# Patient Record
Sex: FEMALE | Race: WHITE
[De-identification: names, ages, dates, MRNs, and addresses within clinical notes are randomized per-mention and may not be internally consistent; named-entity substitution may affect disease eponyms.]

---

## 2017-03-10 ENCOUNTER — HOSPITAL ENCOUNTER (OUTPATIENT)
Dept: HOSPITAL 17 - CPRE | Age: 82
End: 2017-03-10
Attending: ORTHOPAEDIC SURGERY
Payer: MEDICARE

## 2017-03-10 DIAGNOSIS — Z01.812: ICD-10-CM

## 2017-03-10 DIAGNOSIS — Z79.01: ICD-10-CM

## 2017-03-10 DIAGNOSIS — M17.11: ICD-10-CM

## 2017-03-10 DIAGNOSIS — Z01.810: Primary | ICD-10-CM

## 2017-03-10 LAB
ANION GAP SERPL CALC-SCNC: 9 MEQ/L (ref 5–15)
APTT BLD: 27.4 SEC (ref 24.3–30.1)
BASOPHILS # BLD AUTO: 0 TH/MM3 (ref 0–0.2)
BASOPHILS NFR BLD: 0.3 % (ref 0–2)
BUN SERPL-MCNC: 13 MG/DL (ref 7–18)
CHLORIDE SERPL-SCNC: 100 MEQ/L (ref 98–107)
COLOR UR: (no result)
COMMENT (UR): (no result)
CULTURE IF INDICATED: (no result)
EOSINOPHIL # BLD: 0 TH/MM3 (ref 0–0.4)
EOSINOPHIL NFR BLD: 0.7 % (ref 0–4)
ERYTHROCYTE [DISTWIDTH] IN BLOOD BY AUTOMATED COUNT: 12.8 % (ref 11.6–17.2)
ERYTHROCYTE [SEDIMENTATION RATE] IN BLOOD BY WESTERGREN METHOD: 10 MM/HR (ref 0–30)
GFR SERPLBLD BASED ON 1.73 SQ M-ARVRAT: 75 ML/MIN (ref 89–?)
GLUCOSE UR STRIP-MCNC: (no result) MG/DL
HCO3 BLD-SCNC: 28.2 MEQ/L (ref 21–32)
HCT VFR BLD CALC: 42 % (ref 35–46)
HEMO FLAGS: (no result)
HGB UR QL STRIP: (no result)
INR PPP: 0.9 RATIO
KETONES UR STRIP-MCNC: (no result) MG/DL
LYMPHOCYTES # BLD AUTO: 0.9 TH/MM3 (ref 1–4.8)
LYMPHOCYTES NFR BLD AUTO: 16.5 % (ref 9–44)
MCH RBC QN AUTO: 32.5 PG (ref 27–34)
MCHC RBC AUTO-ENTMCNC: 33.9 % (ref 32–36)
MCV RBC AUTO: 96 FL (ref 80–100)
MONOCYTES NFR BLD: 8.6 % (ref 0–8)
NEUTROPHILS # BLD AUTO: 4.1 TH/MM3 (ref 1.8–7.7)
NEUTROPHILS NFR BLD AUTO: 73.9 % (ref 16–70)
NITRITE UR QL STRIP: (no result)
PLATELET # BLD: 238 TH/MM3 (ref 150–450)
POTASSIUM SERPL-SCNC: 4.3 MEQ/L (ref 3.5–5.1)
PROTHROMBIN TIME: 10.4 SEC (ref 9.8–11.6)
RBC # BLD AUTO: 4.38 MIL/MM3 (ref 4–5.3)
SODIUM SERPL-SCNC: 137 MEQ/L (ref 136–145)
SP GR UR STRIP: 1.01 (ref 1–1.03)
WBC # BLD AUTO: 5.6 TH/MM3 (ref 4–11)

## 2017-03-10 PROCEDURE — 80048 BASIC METABOLIC PNL TOTAL CA: CPT

## 2017-03-10 PROCEDURE — 36415 COLL VENOUS BLD VENIPUNCTURE: CPT

## 2017-03-10 PROCEDURE — 85652 RBC SED RATE AUTOMATED: CPT

## 2017-03-10 PROCEDURE — 85025 COMPLETE CBC W/AUTO DIFF WBC: CPT

## 2017-03-10 PROCEDURE — 81001 URINALYSIS AUTO W/SCOPE: CPT

## 2017-03-10 PROCEDURE — 85610 PROTHROMBIN TIME: CPT

## 2017-03-10 PROCEDURE — 85730 THROMBOPLASTIN TIME PARTIAL: CPT

## 2017-03-10 PROCEDURE — 93005 ELECTROCARDIOGRAM TRACING: CPT

## 2017-03-10 NOTE — EKG
Date Performed: 03/10/2017       Time Performed: 08:27:05

 

PTAGE:      84 years

 

EKG:      Sinus rhythm 

 

 Nonspecific ST and T wave abnormalities 

 

NO PREVIOUS TRACING            

 

DOCTOR:   Marquez Pope  Interpretating Date/Time  03/10/2017 18:53:08

## 2017-03-23 ENCOUNTER — HOSPITAL ENCOUNTER (INPATIENT)
Dept: HOSPITAL 17 - HSDC | Age: 82
LOS: 3 days | Discharge: NURSING, REHAB OR LONG-TERM CARE | DRG: 470 | End: 2017-03-26
Attending: ORTHOPAEDIC SURGERY | Admitting: ORTHOPAEDIC SURGERY
Payer: MEDICARE

## 2017-03-23 VITALS
RESPIRATION RATE: 18 BRPM | OXYGEN SATURATION: 98 % | HEART RATE: 80 BPM | SYSTOLIC BLOOD PRESSURE: 125 MMHG | DIASTOLIC BLOOD PRESSURE: 62 MMHG | TEMPERATURE: 95.8 F

## 2017-03-23 VITALS
HEART RATE: 85 BPM | DIASTOLIC BLOOD PRESSURE: 59 MMHG | RESPIRATION RATE: 16 BRPM | OXYGEN SATURATION: 100 % | TEMPERATURE: 96.7 F | SYSTOLIC BLOOD PRESSURE: 103 MMHG

## 2017-03-23 VITALS
OXYGEN SATURATION: 100 % | RESPIRATION RATE: 16 BRPM | SYSTOLIC BLOOD PRESSURE: 132 MMHG | HEART RATE: 75 BPM | TEMPERATURE: 98.4 F | DIASTOLIC BLOOD PRESSURE: 65 MMHG

## 2017-03-23 VITALS — HEIGHT: 64 IN | WEIGHT: 134.92 LBS | BODY MASS INDEX: 23.03 KG/M2

## 2017-03-23 VITALS
HEART RATE: 69 BPM | RESPIRATION RATE: 16 BRPM | DIASTOLIC BLOOD PRESSURE: 75 MMHG | OXYGEN SATURATION: 99 % | SYSTOLIC BLOOD PRESSURE: 134 MMHG | TEMPERATURE: 98 F

## 2017-03-23 DIAGNOSIS — M17.11: Primary | ICD-10-CM

## 2017-03-23 PROCEDURE — 86901 BLOOD TYPING SEROLOGIC RH(D): CPT

## 2017-03-23 PROCEDURE — L1830 KO IMMOB CANVAS LONG PRE OTS: HCPCS

## 2017-03-23 PROCEDURE — C1776 JOINT DEVICE (IMPLANTABLE): HCPCS

## 2017-03-23 PROCEDURE — 73560 X-RAY EXAM OF KNEE 1 OR 2: CPT

## 2017-03-23 PROCEDURE — C9290 INJ, BUPIVACAINE LIPOSOME: HCPCS

## 2017-03-23 PROCEDURE — 86920 COMPATIBILITY TEST SPIN: CPT

## 2017-03-23 PROCEDURE — 0SRC0J9 REPLACEMENT OF RIGHT KNEE JOINT WITH SYNTHETIC SUBSTITUTE, CEMENTED, OPEN APPROACH: ICD-10-PCS | Performed by: ORTHOPAEDIC SURGERY

## 2017-03-23 PROCEDURE — 85014 HEMATOCRIT: CPT

## 2017-03-23 PROCEDURE — 86850 RBC ANTIBODY SCREEN: CPT

## 2017-03-23 PROCEDURE — 85018 HEMOGLOBIN: CPT

## 2017-03-23 PROCEDURE — 86900 BLOOD TYPING SEROLOGIC ABO: CPT

## 2017-03-23 RX ADMIN — MAGNESIUM HYDROXIDE SCH ML: 400 SUSPENSION ORAL at 20:41

## 2017-03-23 RX ADMIN — POVIDONE-IODINE SCH APPLIC: 7.5 SOLUTION TOPICAL at 07:30

## 2017-03-23 RX ADMIN — PRAVASTATIN SODIUM SCH MG: 40 TABLET ORAL at 20:40

## 2017-03-23 RX ADMIN — SODIUM CHLORIDE, PRESERVATIVE FREE SCH ML: 5 INJECTION INTRAVENOUS at 20:41

## 2017-03-23 RX ADMIN — SENNOSIDES SCH MG: 8.6 TABLET, FILM COATED ORAL at 20:41

## 2017-03-23 RX ADMIN — OXYTOCIN SCH MLS/HR: 10 INJECTION, SOLUTION INTRAMUSCULAR; INTRAVENOUS at 23:58

## 2017-03-23 RX ADMIN — OXYTOCIN SCH MLS/HR: 10 INJECTION, SOLUTION INTRAMUSCULAR; INTRAVENOUS at 12:12

## 2017-03-23 NOTE — HHI.DS
Discharge Summary


Admission Date


Mar 23, 2017 at 10:20


Discharge Date:  Mar 26, 2017


Admitting Diagnosis


see below


Diagnosis:  


(1) Right knee pain


Diagnosis:  Principal





(2) Osteoarthritis of right knee


Diagnosis:  Principal





Procedures


Right total knee arthroplasty


Brief History


This is a 85 year old female patient with a history of right knee pain.  When 

her function and independence began to decline she sought out medical 

evaluation.  Imaging studies were performed showing moderate to severe right 

knee arthritis. Conservative measures were pursued for a period of time but 

because of her age, surgical treatment was quickly recommended.  She elected to 

move forward with right total knee arthroplasty.


Hospital Course


Surgical treatment was performed on the day of admission without complication.  

She recovered well in PACU and was transferred to the orthopaedic floor.  Pain 

was controlled with IV and oral medications.  DVT prophylaxis was initiated pod#

1.  She was compliant with physical therapy and all precautions.  After 3 days 

she was found to be stable and discharged to skilled nursing with instruction 

to continue therapy, continue her anticoagulant for 15 days and to pursue a  

high fiber diet for 3-5 days postop.


Pt Condition on Discharge:  Stable


Discharge Disposition:  Discharge to SNF


Discharge Instructions


Diet Instructions:  As Tolerated, No Restrictions, High Fiber Diet


Activities You Can Perform:  Weight Bearing as Deven


Activities to Avoid:  Strenuous Activity


Additional Activity Instruc.:  


TKA protocol


New Medications:  


3-in-1 Bedside Toilet (3-in-1 Bedside Toilet) 1 Mis Mis


1 EA .ROUTE AS DIRECTED #1 EA


CPM-Continuous Passive Motion Machine (CPM-Continuous Passive Motion Machine) 1 

Ea Device


1 EA .ROUTE AS DIRECTED #1 Ref 0 EA


Walker with Front Wheels (Walker with Front Wheels) 1 Mis Mis


1 EA .ROUTE AS DIRECTED #1 Ref 0 EA


Oxycodone-Acetaminophen (Oxycodone-Acetaminophen) 5-325 mg Tab


1 TAB PO Q4H PRN PAIN LESS THAN 5 ON SCALE #50 TAB


Rivaroxaban (Xarelto) 10 Mg Tab


10 MG PO Q24H Prevent Blood Clot #15 TAB


 


Continued Medications:  


Ascorbic Acid (Vitamin C) 500 Mg Cap


500 MG PO DAILY Nutritional Supplement Ref 0 CAP


Biotin (Biotin) 5 Mg Tab


5 MG PO DAILY #1 BOTTLE


Calcium Carbonate-Cholecalciferol (Calcium 600 + D) 600-200 Mg-Unit Tab


2 TAB PO EVERY OTHER DAY TAB


Calcium Carbonate-Vitamin D (Calcium 600+D) 600-400 Mg-Unit Tab


1 TAB PO DAILY TAB


Krill Oil (Krill Oil 500 mg) 1 Cap Cap


1 CAP PO DAILY


Simvastatin (Simvastatin) 20 Mg Tab


20 MG PO HS Cholesterol Management #30 Ref 0 TAB











Thao Sandoval Mar 23, 2017 15:05

## 2017-03-23 NOTE — PD.OP
cc:   Arron Oconnell MD


__________________________________________________





Operative Report


Date of Surgery:  Mar 23, 2017


Preoperative Diagnosis:  


Osteoarthritis right knee, severe


Postoperative Diagnosis:  


Same


Procedure:


Right total knee replacement arthroplasty


Anesthesia:


Gen. with regional


Surgeon:


Arron Oconnell


Assistant(s):


TAYLOR Sigala


Operation and Findings:


EBL: 50 cc





INDICATION: This patient presents with long-standing arthritis of the knee.  

Attachment record documents conservative measures.  The patient now presents 

for surgical treatment.





NOTE: Dasha Sigala PA-C was present for the entire surgical procedure as my 

first assistant.  In my medical opinion her skill and care was necessary for 

proper management of this patient.





TOURNIQUET TIME: 42 minutes





COMPANY: [employer]


   FEMUR: Size 3, right, cruciate retaining


   TIBIA: Size 3, fixed bearing


   PATELLA: 35 mm


   POLYETHYLENE INSERT: 11 mm





PROCEDURE: This patient was brought the operating room and anesthetized in the 

supine position.  The patient was positioned supine on the table.  The 

tourniquet was placed about the thigh, and the leg was scrubbed with alcohol 

followed by Hibiclens followed by ChloraPrep and draped sterilely.  A timeout 

was done, and antibiotics were given.  After exsanguination the tourniquet was 

inflated to 250 mmHg.  An anterior incision was made and a median parapatellar 

arthrotomy was performed.  The patella was released laterally and subluxed 

allowing freehand cut of the patella which was then sized.  A metal cap was 

placed over the exposed patellar surface for protection.





A  hole was placed in the distal femur allowing a 5 valgus cut removing 

10 mm from the distal femur.  Anterior posterior and chamfer cuts were made.  

The posterior stabilize osteotomy was made.





The attention was directed to the tibia.  Retractors were positioned.  The 

external alignment guide was used allowing the lateral tibia to be used as 

referencing guide and cut utilizing an oscillating saw taking care to avoid any 

injury to the surrounding soft tissues.  This was sized properly.  Trial 

reduction showed that the insert fit nicely.  The patient had range of motion 

extension 0 flexion 125.  A medial release was not necessary.





The bony surfaces prepared.  On the back table 2 packets of methylmethacrylate 

were mixed.  The components were cemented.  Excess cement was removed.  The 

tourniquet let down and hemostasis was controlled.  The final plastic insert 

was inserted.  Range of motion was the same as previously  noted.





A drain was brought through a separate stab incision.  The arthrotomy was 

repaired with interrupted #1 Vicryl suture, subcutaneous tissue 2-0 Vicryl 

suture and skin with metallic staples





A sterile dressing was applied.  Sponge counts, needle counts and instrument 

counts were all correct.  The patient tolerated procedure well and was taken to 

recovery in satisfactory condition.





FINDINGS: There was evidence of severe erosion of articular cartilage and bone 

stock in the medial compartment.  There was also approximately 40-50% loss of 

articular cartilage especially on the medial aspect of the lateral tibia 

plateau extending into the intercondylar notch showing evidence of significant 

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








Arron Oconnell MD Mar 23, 2017 10:23

## 2017-03-23 NOTE — RADRPT
EXAM DATE/TIME:  03/23/2017 10:36 

 

[facility] COMPARISON:     

No previous studies available for comparison.

 

                     

INDICATIONS :     

Post right knee arthroplasty

                     

 

MEDICAL HISTORY :     

Arthritis.          

 

SURGICAL HISTORY :     

None.   

 

ENCOUNTER:     

Initial                                        

 

ACUITY:     

1 day      

 

PAIN SCORE:     

Non-responsive.

 

LOCATION:     

Right  Knee

 

FINDINGS:     

The patient is status post right total knee replacement with prosthesis in good position.  There is n
o fracture or dislocation. 

 

CONCLUSION:     

 

1. Status pot right total knee replacement with prosthesis in good position. 

2.  No acute fracture or dislocation. 

 

 Tushar Sandoval MD on March 23, 2017 at 11:06                

Board Certified Radiologist.

 This report was verified electronically.

## 2017-03-23 NOTE — HHI.DCPOC
Discharge Care Plan


Diagnosis:  


(1) Right knee pain


(2) Osteoarthritis of right knee


Your Health Problems Are:     Incision/Drains


Goals to Promote Your Health


* To prevent worsening of your condition and complications


* To maintain your health at the optimal level


Directions to Meet Your Goals


*** Take your medications as prescribed


*** Follow your dietary instruction


*** Follow activity as directed








*** Keep your appointments as scheduled


*** Take your immunizations and boosters as scheduled


*** If your symptoms worsen call your PCP, if no PCP go to Urgent Care Center 

or Emergency Room***


*** Smoking is Dangerous to Your Health. Avoid second hand smoke***


***Call the 24-hour hour crisis hotline for domestic abuse at [phone]***








Thao Sandoval Mar 23, 2017 15:01

## 2017-03-24 VITALS
HEART RATE: 86 BPM | SYSTOLIC BLOOD PRESSURE: 100 MMHG | DIASTOLIC BLOOD PRESSURE: 56 MMHG | OXYGEN SATURATION: 97 % | RESPIRATION RATE: 17 BRPM | TEMPERATURE: 96.9 F

## 2017-03-24 VITALS
RESPIRATION RATE: 16 BRPM | TEMPERATURE: 96.5 F | DIASTOLIC BLOOD PRESSURE: 57 MMHG | OXYGEN SATURATION: 97 % | HEART RATE: 79 BPM | SYSTOLIC BLOOD PRESSURE: 100 MMHG

## 2017-03-24 VITALS
HEART RATE: 68 BPM | OXYGEN SATURATION: 100 % | TEMPERATURE: 95.5 F | SYSTOLIC BLOOD PRESSURE: 115 MMHG | RESPIRATION RATE: 16 BRPM | DIASTOLIC BLOOD PRESSURE: 61 MMHG

## 2017-03-24 VITALS
DIASTOLIC BLOOD PRESSURE: 49 MMHG | OXYGEN SATURATION: 99 % | HEART RATE: 75 BPM | RESPIRATION RATE: 17 BRPM | TEMPERATURE: 98 F | SYSTOLIC BLOOD PRESSURE: 102 MMHG

## 2017-03-24 VITALS
OXYGEN SATURATION: 94 % | TEMPERATURE: 96.8 F | HEART RATE: 90 BPM | DIASTOLIC BLOOD PRESSURE: 54 MMHG | RESPIRATION RATE: 17 BRPM | SYSTOLIC BLOOD PRESSURE: 109 MMHG

## 2017-03-24 VITALS
TEMPERATURE: 96.1 F | HEART RATE: 77 BPM | SYSTOLIC BLOOD PRESSURE: 121 MMHG | OXYGEN SATURATION: 97 % | DIASTOLIC BLOOD PRESSURE: 63 MMHG | RESPIRATION RATE: 16 BRPM

## 2017-03-24 LAB
HCT VFR BLD CALC: 28.4 % (ref 35–46)
REVIEW FLAG: (no result)

## 2017-03-24 RX ADMIN — MAGNESIUM HYDROXIDE SCH ML: 400 SUSPENSION ORAL at 21:00

## 2017-03-24 RX ADMIN — SENNOSIDES SCH MG: 8.6 TABLET, FILM COATED ORAL at 21:01

## 2017-03-24 RX ADMIN — PRAVASTATIN SODIUM SCH MG: 40 TABLET ORAL at 21:01

## 2017-03-24 RX ADMIN — OXYCODONE HYDROCHLORIDE AND ACETAMINOPHEN PRN TAB: 5; 325 TABLET ORAL at 13:11

## 2017-03-24 RX ADMIN — SODIUM CHLORIDE, PRESERVATIVE FREE SCH ML: 5 INJECTION INTRAVENOUS at 21:00

## 2017-03-24 RX ADMIN — OXYTOCIN SCH MLS/HR: 10 INJECTION, SOLUTION INTRAMUSCULAR; INTRAVENOUS at 20:56

## 2017-03-24 RX ADMIN — OXYCODONE HYDROCHLORIDE AND ACETAMINOPHEN PRN TAB: 5; 325 TABLET ORAL at 21:01

## 2017-03-24 RX ADMIN — OXYTOCIN SCH MLS/HR: 10 INJECTION, SOLUTION INTRAMUSCULAR; INTRAVENOUS at 11:40

## 2017-03-24 RX ADMIN — MAGNESIUM HYDROXIDE SCH ML: 400 SUSPENSION ORAL at 10:42

## 2017-03-24 RX ADMIN — RIVAROXABAN SCH MG: 10 TABLET, FILM COATED ORAL at 10:42

## 2017-03-24 RX ADMIN — POVIDONE-IODINE SCH APPLIC: 7.5 SOLUTION TOPICAL at 05:50

## 2017-03-24 RX ADMIN — OXYCODONE HYDROCHLORIDE AND ACETAMINOPHEN PRN TAB: 5; 325 TABLET ORAL at 08:45

## 2017-03-24 RX ADMIN — SODIUM CHLORIDE, PRESERVATIVE FREE SCH ML: 5 INJECTION INTRAVENOUS at 09:00

## 2017-03-24 NOTE — PD.ORT.PN
Subjective


Subjective Remarks


Mild to moderate right knee pain.  Well controlled at this point.  No new 

radiating leg pain.  No CP or SOB.  No other concerns.  Planning on d/c to snf.

  Children in town to help.





Objective


Vitals





 Vital Signs








  Date Time  Temp Pulse Resp B/P Pulse Ox O2 Delivery O2 Flow Rate FiO2


 


3/24/17 05:47   17     


 


3/24/17 04:35 96.9 86 17 100/56 97   


 


3/24/17 00:40 98.0 75 17 102/49 99   


 


3/23/17 22:39   18     


 


3/23/17 20:00 98.4 75 16 132/65 100   


 


3/23/17 16:00 96.7 85 16 103/59 100   


 


3/23/17 13:47   17     


 


3/23/17 13:35     98 Nasal Cannula 1.00 


 


3/23/17 13:30 95.8 80 18 125/62 98   


 


3/23/17 13:15  74 12 126/67 99 Nasal Cannula 2 


 


3/23/17 13:00 97.4 75 14 127/71 100 Nasal Cannula 2 


 


3/23/17 12:53 97.4       


 


3/23/17 12:30  74 12 130/72 99 Nasal Cannula 2 


 


3/23/17 12:15  73 13 128/72 99 Nasal Cannula 2 


 


3/23/17 12:00  76 14 133/74 99 Nasal Cannula 2 


 


3/23/17 11:45  71 15 136/76 99 Nasal Cannula 2 


 


3/23/17 11:35 96.2       


 


3/23/17 11:30  81 14 132/80 98 Nasal Cannula 2 


 


3/23/17 11:15  78 15 118/84 98 Nasal Cannula 2 


 


3/23/17 11:10   15     


 


3/23/17 11:00  79 14 147/77 99 Nasal Cannula 2 


 


3/23/17 10:45  89 15 143/86 100 Nasal Cannula 2 


 


3/23/17 10:40 96.2       


 


3/23/17 10:35 96.2 93 15 144/85 100 Nasal Cannula 2 








 I/O








 3/23/17 3/23/17 3/23/17 3/24/17 3/24/17 3/24/17





 07:00 15:00 23:00 07:00 15:00 23:00


 


Intake Total  1691 ml 823 ml 712 ml  


 


Output Total  545 ml 2235 ml 955 ml  


 


Balance  1146 ml -1412 ml -243 ml  


 


      


 


Intake Oral   120 ml 120 ml  


 


IV Total  191 ml 703 ml 592 ml  


 


Other  1500 ml    


 


Output Urine Total  100 ml 1975 ml 775 ml  


 


Drainage Total  70 ml 260 ml 180 ml  


 


Estimated Blood Loss  25 ml    


 


Other  350 ml    








Result Diagram:  


3/24/17 0505





Imaging





Last 24 hours Impressions








Knee X-Ray 3/23/17 1015 Signed





Impressions: 





 Service Date/Time:  Thursday, March 23, 2017 10:36 - CONCLUSION:   1. Status 

pot 





 right total knee replacement with prosthesis in good position.  2.  No acute 





 fracture or dislocation.    Tushar Sandoval MD 








Procedures


Right total knee arthroplasty


Objective Remarks


Laying in bed


NAD


VSS





RLE   Dressing c/d/i, drain in place, mild swelling, no erythema


      thigh and calf both supple, neg homans


      +motor at,  +sens,  +nvi





Assessment & Plan


Ortho Post Op Day #:  1


Problem List:  


(1) Right knee pain


(2) Osteoarthritis of right knee


Assessment and Plan


pod#1 s/p R TKA





D/C PCA - change to po pain meds. 


Xarelto 10mg qd. 


Hold dressing changes unless saturated.  D/C knee drain today.


PT - WBAT RLE.  TKA protocol. CKS in bed. 


D/C planning, SNF sunday. 


F/U in 2 weeks as scheduled. 


[id number] signed.








Thao Sandoval Mar 24, 2017 07:53

## 2017-03-25 VITALS
RESPIRATION RATE: 16 BRPM | HEART RATE: 81 BPM | SYSTOLIC BLOOD PRESSURE: 101 MMHG | TEMPERATURE: 98.5 F | DIASTOLIC BLOOD PRESSURE: 52 MMHG | OXYGEN SATURATION: 94 %

## 2017-03-25 VITALS
OXYGEN SATURATION: 97 % | RESPIRATION RATE: 18 BRPM | SYSTOLIC BLOOD PRESSURE: 109 MMHG | TEMPERATURE: 96.8 F | HEART RATE: 100 BPM | DIASTOLIC BLOOD PRESSURE: 63 MMHG

## 2017-03-25 VITALS
OXYGEN SATURATION: 96 % | DIASTOLIC BLOOD PRESSURE: 69 MMHG | TEMPERATURE: 99.8 F | HEART RATE: 88 BPM | SYSTOLIC BLOOD PRESSURE: 115 MMHG | RESPIRATION RATE: 18 BRPM

## 2017-03-25 VITALS
OXYGEN SATURATION: 94 % | TEMPERATURE: 100.1 F | HEART RATE: 88 BPM | SYSTOLIC BLOOD PRESSURE: 116 MMHG | DIASTOLIC BLOOD PRESSURE: 59 MMHG | RESPIRATION RATE: 18 BRPM

## 2017-03-25 VITALS
OXYGEN SATURATION: 95 % | DIASTOLIC BLOOD PRESSURE: 54 MMHG | HEART RATE: 85 BPM | RESPIRATION RATE: 18 BRPM | SYSTOLIC BLOOD PRESSURE: 101 MMHG | TEMPERATURE: 99.4 F

## 2017-03-25 RX ADMIN — OXYCODONE HYDROCHLORIDE AND ACETAMINOPHEN PRN TAB: 5; 325 TABLET ORAL at 08:21

## 2017-03-25 RX ADMIN — RIVAROXABAN SCH MG: 10 TABLET, FILM COATED ORAL at 08:20

## 2017-03-25 RX ADMIN — PRAVASTATIN SODIUM SCH MG: 40 TABLET ORAL at 22:44

## 2017-03-25 RX ADMIN — OXYTOCIN SCH MLS/HR: 10 INJECTION, SOLUTION INTRAMUSCULAR; INTRAVENOUS at 12:15

## 2017-03-25 RX ADMIN — SODIUM CHLORIDE, PRESERVATIVE FREE SCH ML: 5 INJECTION INTRAVENOUS at 22:45

## 2017-03-25 RX ADMIN — MAGNESIUM HYDROXIDE SCH ML: 400 SUSPENSION ORAL at 21:00

## 2017-03-25 RX ADMIN — SENNOSIDES SCH MG: 8.6 TABLET, FILM COATED ORAL at 21:00

## 2017-03-25 RX ADMIN — MAGNESIUM HYDROXIDE SCH ML: 400 SUSPENSION ORAL at 08:20

## 2017-03-25 RX ADMIN — OXYCODONE HYDROCHLORIDE AND ACETAMINOPHEN PRN TAB: 5; 325 TABLET ORAL at 18:16

## 2017-03-25 RX ADMIN — SODIUM CHLORIDE, PRESERVATIVE FREE SCH ML: 5 INJECTION INTRAVENOUS at 08:20

## 2017-03-25 RX ADMIN — POVIDONE-IODINE SCH APPLIC: 7.5 SOLUTION TOPICAL at 04:26

## 2017-03-25 NOTE — PD.ORT.PN
Subjective


Subjective Remarks


POD 2 s/p right TKA


doing well. pain controlled. walked with walker yesterday.





Objective


Vitals





 Vital Signs








  Date Time  Temp Pulse Resp B/P Pulse Ox O2 Delivery O2 Flow Rate FiO2


 


3/25/17 00:25 98.5 81 16 101/52 94   


 


3/24/17 20:30 96.8 90 17 109/54 94   


 


3/24/17 16:00 96.1 77 16 121/63 97   


 


3/24/17 12:35 95.5 68 16 115/61 100   


 


3/24/17 07:35 96.5 79 16 100/57 97   








 I/O








 3/24/17 3/24/17 3/24/17 3/25/17 3/25/17 3/25/17





 07:00 15:00 23:00 07:00 15:00 23:00


 


Intake Total 712 ml 1310 ml 120 ml 60 ml  


 


Output Total 955 ml 350 ml    


 


Balance -243 ml 960 ml 120 ml 60 ml  


 


      


 


Intake Oral 120 ml 720 ml 120 ml 60 ml  


 


IV Total 592 ml 590 ml    


 


Output Urine Total 775 ml 300 ml    


 


Drainage Total 180 ml 50 ml    


 


# Voids  1 1 1  


 


# Bowel Movements   0 0  








Result Diagram:  


3/24/17 0505





Imaging





Last 24 hours Impressions








Knee X-Ray 3/23/17 1015 Signed





Impressions: 





 Service Date/Time:  Thursday, March 23, 2017 10:36 - CONCLUSION:   1. Status 

pot 





 right total knee replacement with prosthesis in good position.  2.  No acute 





 fracture or dislocation.    Tushar Sandoval MD 








Procedures


Right total knee arthroplasty


Objective Remarks


Laying in bed


NAD


VSS





RLE   Dressing c/d/i, drain in place, mild swelling, no erythema


      thigh and calf both supple, neg homans


      +motor at,  +sens,  +nvi





Assessment & Plan


Problem List:  


(1) Right knee pain


(2) Osteoarthritis of right knee


Assessment and Plan


pod#2 s/p R TKA





D/C PCA - change to po pain meds. 


Xarelto 10mg qd. 


Hold dressing changes unless saturated.  


PT - WBAT RLE.  TKA protocol. CKS in bed. 


D/C planning, SNF sunday. 


F/U in 2 weeks as scheduled. 


[id number] signed.








Thor Henry Mar 25, 2017 07:13

## 2017-03-26 VITALS
RESPIRATION RATE: 18 BRPM | TEMPERATURE: 98.7 F | HEART RATE: 81 BPM | SYSTOLIC BLOOD PRESSURE: 114 MMHG | OXYGEN SATURATION: 95 % | DIASTOLIC BLOOD PRESSURE: 58 MMHG

## 2017-03-26 VITALS
RESPIRATION RATE: 18 BRPM | DIASTOLIC BLOOD PRESSURE: 54 MMHG | TEMPERATURE: 95.8 F | SYSTOLIC BLOOD PRESSURE: 100 MMHG | OXYGEN SATURATION: 99 % | HEART RATE: 95 BPM

## 2017-03-26 RX ADMIN — SODIUM CHLORIDE, PRESERVATIVE FREE SCH ML: 5 INJECTION INTRAVENOUS at 08:56

## 2017-03-26 RX ADMIN — MAGNESIUM HYDROXIDE SCH ML: 400 SUSPENSION ORAL at 08:53

## 2017-03-26 RX ADMIN — OXYCODONE HYDROCHLORIDE AND ACETAMINOPHEN PRN TAB: 5; 325 TABLET ORAL at 03:22

## 2017-03-26 RX ADMIN — OXYCODONE HYDROCHLORIDE AND ACETAMINOPHEN PRN TAB: 5; 325 TABLET ORAL at 08:55

## 2017-03-26 RX ADMIN — RIVAROXABAN SCH MG: 10 TABLET, FILM COATED ORAL at 08:53

## 2017-03-26 NOTE — PD.ORT.PN
Subjective


Subjective Remarks


POD 3 s/p right TKA


doing well. pain controlled. walked with walker yesterday.





Objective


Vitals





 Vital Signs








  Date Time  Temp Pulse Resp B/P Pulse Ox O2 Delivery O2 Flow Rate FiO2


 


3/26/17 00:02 98.7 81 18 114/58 95   


 


3/25/17 20:48 99.4 85 18 101/54 95   


 


3/25/17 16:00 99.8 88 18 115/69 96   


 


3/25/17 12:00 96.8 100 18 109/63 97   


 


3/25/17 08:00 100.1 88 18 116/59 94   








 I/O








 3/25/17 3/25/17 3/25/17 3/26/17 3/26/17 3/26/17





 07:00 15:00 23:00 07:00 15:00 23:00


 


Intake Total 60 ml 720 ml    


 


Balance 60 ml 720 ml    


 


      


 


Intake Oral 60 ml 720 ml    


 


# Voids 1 3  4  


 


# Bowel Movements 0 2  1  








Result Diagram:  


3/24/17 0505





Imaging





Last 24 hours Impressions








Knee X-Ray 3/23/17 1015 Signed





Impressions: 





 Service Date/Time:  Thursday, March 23, 2017 10:36 - CONCLUSION:   1. Status 

pot 





 right total knee replacement with prosthesis in good position.  2.  No acute 





 fracture or dislocation.    Tushar Sandoval MD 








Procedures


Right total knee arthroplasty


Objective Remarks


Laying in bed


NAD


VSS





RLE   Dressing c/d/i, drain in place, mild swelling, no erythema


      thigh and calf both supple, neg homans


      +motor at,  +sens,  +nvi





Assessment & Plan


Problem List:  


(1) Right knee pain


(2) Osteoarthritis of right knee


Assessment and Plan


pod#3 s/p R TKA





D/C PCA - change to po pain meds. 


Xarelto 10mg qd. 


Hold dressing changes unless saturated.  


PT - WBAT RLE.  TKA protocol. CKS in bed. 


D/C planning, SNF sunday. 


F/U in 2 weeks as scheduled. 


[id number] signed.








Thor Henry Mar 26, 2017 06:46

## 2018-04-23 ENCOUNTER — APPOINTMENT (RX ONLY)
Dept: URBAN - METROPOLITAN AREA CLINIC 53 | Facility: CLINIC | Age: 83
Setting detail: DERMATOLOGY
End: 2018-04-23

## 2018-04-23 DIAGNOSIS — L57.0 ACTINIC KERATOSIS: ICD-10-CM

## 2018-04-23 DIAGNOSIS — D22 MELANOCYTIC NEVI: ICD-10-CM

## 2018-04-23 DIAGNOSIS — D18.0 HEMANGIOMA: ICD-10-CM

## 2018-04-23 DIAGNOSIS — L82.1 OTHER SEBORRHEIC KERATOSIS: ICD-10-CM

## 2018-04-23 DIAGNOSIS — Z85.828 PERSONAL HISTORY OF OTHER MALIGNANT NEOPLASM OF SKIN: ICD-10-CM | Status: RESOLVED

## 2018-04-23 DIAGNOSIS — L81.4 OTHER MELANIN HYPERPIGMENTATION: ICD-10-CM

## 2018-04-23 PROBLEM — D22.5 MELANOCYTIC NEVI OF TRUNK: Status: ACTIVE | Noted: 2018-04-23

## 2018-04-23 PROBLEM — D18.01 HEMANGIOMA OF SKIN AND SUBCUTANEOUS TISSUE: Status: ACTIVE | Noted: 2018-04-23

## 2018-04-23 PROBLEM — K21.9 GASTRO-ESOPHAGEAL REFLUX DISEASE WITHOUT ESOPHAGITIS: Status: ACTIVE | Noted: 2018-04-23

## 2018-04-23 PROBLEM — E78.5 HYPERLIPIDEMIA, UNSPECIFIED: Status: ACTIVE | Noted: 2018-04-23

## 2018-04-23 PROBLEM — L85.3 XEROSIS CUTIS: Status: ACTIVE | Noted: 2018-04-23

## 2018-04-23 PROCEDURE — ? LIQUID NITROGEN

## 2018-04-23 PROCEDURE — ? COUNSELING

## 2018-04-23 PROCEDURE — 17000 DESTRUCT PREMALG LESION: CPT

## 2018-04-23 PROCEDURE — 17003 DESTRUCT PREMALG LES 2-14: CPT

## 2018-04-23 PROCEDURE — 99213 OFFICE O/P EST LOW 20 MIN: CPT | Mod: 25

## 2018-04-23 ASSESSMENT — LOCATION SIMPLE DESCRIPTION DERM
LOCATION SIMPLE: LEFT CALF
LOCATION SIMPLE: RIGHT THIGH
LOCATION SIMPLE: RIGHT POPLITEAL SKIN
LOCATION SIMPLE: ABDOMEN
LOCATION SIMPLE: RIGHT PRETIBIAL REGION
LOCATION SIMPLE: LEFT UPPER BACK
LOCATION SIMPLE: LEFT PRETIBIAL REGION
LOCATION SIMPLE: CHEST
LOCATION SIMPLE: RIGHT LOWER BACK

## 2018-04-23 ASSESSMENT — LOCATION DETAILED DESCRIPTION DERM
LOCATION DETAILED: EPIGASTRIC SKIN
LOCATION DETAILED: RIGHT ANTERIOR PROXIMAL THIGH
LOCATION DETAILED: RIGHT DISTAL PRETIBIAL REGION
LOCATION DETAILED: RIGHT POPLITEAL SKIN
LOCATION DETAILED: LEFT DISTAL CALF
LOCATION DETAILED: LEFT LATERAL PROXIMAL PRETIBIAL REGION
LOCATION DETAILED: LEFT SUPERIOR MEDIAL UPPER BACK
LOCATION DETAILED: LEFT MEDIAL SUPERIOR CHEST
LOCATION DETAILED: RIGHT SUPERIOR MEDIAL MIDBACK

## 2018-04-23 ASSESSMENT — LOCATION ZONE DERM
LOCATION ZONE: LEG
LOCATION ZONE: TRUNK

## 2018-04-23 NOTE — PROCEDURE: LIQUID NITROGEN
Number Of Freeze-Thaw Cycles: 1 freeze-thaw cycle
Consent: The patient's consent was obtained including but not limited to risks of crusting, scabbing, blistering, scarring, darker or lighter pigmentary change, recurrence, incomplete removal and infection.
Duration Of Freeze Thaw-Cycle (Seconds): 3
Render Post-Care Instructions In Note?: yes
Detail Level: Detailed
Post-Care Instructions: I reviewed with the patient in detail post-care instructions. Patient is to wear sunprotection, and avoid picking at any of the treated lesions. Pt may apply Vaseline to crusted or scabbing areas.

## 2018-11-05 ENCOUNTER — APPOINTMENT (RX ONLY)
Dept: URBAN - METROPOLITAN AREA CLINIC 53 | Facility: CLINIC | Age: 83
Setting detail: DERMATOLOGY
End: 2018-11-05

## 2018-11-05 DIAGNOSIS — D18.0 HEMANGIOMA: ICD-10-CM

## 2018-11-05 DIAGNOSIS — L82.1 OTHER SEBORRHEIC KERATOSIS: ICD-10-CM

## 2018-11-05 DIAGNOSIS — D22 MELANOCYTIC NEVI: ICD-10-CM

## 2018-11-05 DIAGNOSIS — Z85.828 PERSONAL HISTORY OF OTHER MALIGNANT NEOPLASM OF SKIN: ICD-10-CM | Status: RESOLVED

## 2018-11-05 DIAGNOSIS — L57.0 ACTINIC KERATOSIS: ICD-10-CM

## 2018-11-05 DIAGNOSIS — L81.4 OTHER MELANIN HYPERPIGMENTATION: ICD-10-CM

## 2018-11-05 DIAGNOSIS — L57.8 OTHER SKIN CHANGES DUE TO CHRONIC EXPOSURE TO NONIONIZING RADIATION: ICD-10-CM

## 2018-11-05 PROBLEM — D22.5 MELANOCYTIC NEVI OF TRUNK: Status: ACTIVE | Noted: 2018-11-05

## 2018-11-05 PROBLEM — D18.01 HEMANGIOMA OF SKIN AND SUBCUTANEOUS TISSUE: Status: ACTIVE | Noted: 2018-11-05

## 2018-11-05 PROCEDURE — 17000 DESTRUCT PREMALG LESION: CPT

## 2018-11-05 PROCEDURE — ? LIQUID NITROGEN

## 2018-11-05 PROCEDURE — ? COUNSELING

## 2018-11-05 PROCEDURE — 17003 DESTRUCT PREMALG LES 2-14: CPT

## 2018-11-05 PROCEDURE — ? IN-HOUSE DISPENSING PHARMACY

## 2018-11-05 PROCEDURE — 99214 OFFICE O/P EST MOD 30 MIN: CPT | Mod: 25

## 2018-11-05 ASSESSMENT — LOCATION DETAILED DESCRIPTION DERM
LOCATION DETAILED: LEFT SUPERIOR UPPER BACK
LOCATION DETAILED: RIGHT SUPERIOR MEDIAL UPPER BACK
LOCATION DETAILED: UPPER STERNUM
LOCATION DETAILED: RIGHT ANTERIOR PROXIMAL THIGH
LOCATION DETAILED: LEFT PROXIMAL CALF
LOCATION DETAILED: LEFT INFERIOR CENTRAL MALAR CHEEK
LOCATION DETAILED: SUPERIOR LUMBAR SPINE
LOCATION DETAILED: EPIGASTRIC SKIN
LOCATION DETAILED: LEFT SUPERIOR LATERAL UPPER BACK
LOCATION DETAILED: LEFT CENTRAL LATERAL NECK
LOCATION DETAILED: LEFT LATERAL SUBMANDIBULAR CHEEK
LOCATION DETAILED: LEFT MID-UPPER BACK
LOCATION DETAILED: SUPERIOR THORACIC SPINE

## 2018-11-05 ASSESSMENT — LOCATION SIMPLE DESCRIPTION DERM
LOCATION SIMPLE: NECK
LOCATION SIMPLE: LEFT CHEEK
LOCATION SIMPLE: RIGHT UPPER BACK
LOCATION SIMPLE: RIGHT THIGH
LOCATION SIMPLE: UPPER BACK
LOCATION SIMPLE: LEFT CALF
LOCATION SIMPLE: ABDOMEN
LOCATION SIMPLE: CHEST
LOCATION SIMPLE: LOWER BACK
LOCATION SIMPLE: LEFT UPPER BACK

## 2018-11-05 ASSESSMENT — LOCATION ZONE DERM
LOCATION ZONE: FACE
LOCATION ZONE: LEG
LOCATION ZONE: NECK
LOCATION ZONE: TRUNK

## 2018-11-05 NOTE — PROCEDURE: LIQUID NITROGEN
Number Of Freeze-Thaw Cycles: 1 freeze-thaw cycle
Render Post-Care Instructions In Note?: yes
Consent: The patient's consent was obtained including but not limited to risks of crusting, scabbing, blistering, scarring, darker or lighter pigmentary change, recurrence, incomplete removal and infection.
Post-Care Instructions: I reviewed with the patient in detail post-care instructions. Patient is to wear sunprotection, and avoid picking at any of the treated lesions. Pt may apply Vaseline to crusted or scabbing areas.
Duration Of Freeze Thaw-Cycle (Seconds): 3
Detail Level: Zone

## 2018-11-05 NOTE — PROCEDURE: IN-HOUSE DISPENSING PHARMACY
Product 13 Units Dispensed: 0
Product 27 Unit Type: mg
Product 1 Unit Type: tube(s)
Render Product Pricing In Note: Yes
Product 2 Price/Unit (In Dollars): 45
Product 1 Application Directions: Apply qhs
Product 3 Application Directions: Apply to upper lash line qhs
Product 3 Unit Type: ml
Name Of Product 1: Tretinoin 0.1% Cream
Product 1 Units Dispensed: 1
Name Of Product 3: Latisse
Detail Level: Zone
Product 1 Refills: 2
Product 3 Price/Unit (In Dollars): 175

## 2019-05-06 ENCOUNTER — APPOINTMENT (RX ONLY)
Dept: URBAN - METROPOLITAN AREA CLINIC 53 | Facility: CLINIC | Age: 84
Setting detail: DERMATOLOGY
End: 2019-05-06

## 2019-05-06 DIAGNOSIS — L82.1 OTHER SEBORRHEIC KERATOSIS: ICD-10-CM

## 2019-05-06 DIAGNOSIS — L57.8 OTHER SKIN CHANGES DUE TO CHRONIC EXPOSURE TO NONIONIZING RADIATION: ICD-10-CM

## 2019-05-06 DIAGNOSIS — D18.0 HEMANGIOMA: ICD-10-CM

## 2019-05-06 DIAGNOSIS — Z85.828 PERSONAL HISTORY OF OTHER MALIGNANT NEOPLASM OF SKIN: ICD-10-CM | Status: RESOLVED

## 2019-05-06 DIAGNOSIS — L81.4 OTHER MELANIN HYPERPIGMENTATION: ICD-10-CM

## 2019-05-06 DIAGNOSIS — D22 MELANOCYTIC NEVI: ICD-10-CM

## 2019-05-06 DIAGNOSIS — L57.0 ACTINIC KERATOSIS: ICD-10-CM

## 2019-05-06 PROBLEM — D18.01 HEMANGIOMA OF SKIN AND SUBCUTANEOUS TISSUE: Status: ACTIVE | Noted: 2019-05-06

## 2019-05-06 PROBLEM — D22.72 MELANOCYTIC NEVI OF LEFT LOWER LIMB, INCLUDING HIP: Status: ACTIVE | Noted: 2019-05-06

## 2019-05-06 PROCEDURE — 17003 DESTRUCT PREMALG LES 2-14: CPT

## 2019-05-06 PROCEDURE — ? COUNSELING

## 2019-05-06 PROCEDURE — 17000 DESTRUCT PREMALG LESION: CPT

## 2019-05-06 PROCEDURE — 99213 OFFICE O/P EST LOW 20 MIN: CPT | Mod: 25

## 2019-05-06 PROCEDURE — ? LIQUID NITROGEN

## 2019-05-06 PROCEDURE — ? INVENTORY

## 2019-05-06 ASSESSMENT — LOCATION SIMPLE DESCRIPTION DERM
LOCATION SIMPLE: LEFT POSTERIOR THIGH
LOCATION SIMPLE: LEFT CALF
LOCATION SIMPLE: RIGHT SHOULDER
LOCATION SIMPLE: RIGHT CALF
LOCATION SIMPLE: LEFT LIP
LOCATION SIMPLE: RIGHT THIGH
LOCATION SIMPLE: UPPER BACK
LOCATION SIMPLE: CHEST
LOCATION SIMPLE: LEFT THIGH

## 2019-05-06 ASSESSMENT — LOCATION ZONE DERM
LOCATION ZONE: TRUNK
LOCATION ZONE: LIP
LOCATION ZONE: ARM
LOCATION ZONE: LEG

## 2019-05-06 ASSESSMENT — LOCATION DETAILED DESCRIPTION DERM
LOCATION DETAILED: SUPERIOR THORACIC SPINE
LOCATION DETAILED: RIGHT ANTERIOR SHOULDER
LOCATION DETAILED: RIGHT ANTERIOR DISTAL THIGH
LOCATION DETAILED: LEFT ANTERIOR PROXIMAL THIGH
LOCATION DETAILED: STERNAL NOTCH
LOCATION DETAILED: RIGHT PROXIMAL CALF
LOCATION DETAILED: LEFT PROXIMAL CALF
LOCATION DETAILED: LEFT DISTAL CALF
LOCATION DETAILED: LEFT INFERIOR VERMILION LIP
LOCATION DETAILED: LEFT DISTAL POSTERIOR THIGH

## 2019-05-06 NOTE — PROCEDURE: LIQUID NITROGEN
Number Of Freeze-Thaw Cycles: 1 freeze-thaw cycle
Post-Care Instructions: I reviewed with the patient in detail post-care instructions. Patient is to wear sunprotection, and avoid picking at any of the treated lesions. Pt may apply Vaseline to crusted or scabbing areas.
Render Note In Bullet Format When Appropriate: No
Consent: The patient's consent was obtained including but not limited to risks of crusting, scabbing, blistering, scarring, darker or lighter pigmentary change, recurrence, incomplete removal and infection.
Duration Of Freeze Thaw-Cycle (Seconds): 3
Detail Level: Zone
Render Post-Care Instructions In Note?: yes

## 2019-11-05 ENCOUNTER — APPOINTMENT (RX ONLY)
Dept: URBAN - METROPOLITAN AREA CLINIC 53 | Facility: CLINIC | Age: 84
Setting detail: DERMATOLOGY
End: 2019-11-05

## 2019-11-05 DIAGNOSIS — L57.0 ACTINIC KERATOSIS: ICD-10-CM

## 2019-11-05 DIAGNOSIS — L82.1 OTHER SEBORRHEIC KERATOSIS: ICD-10-CM

## 2019-11-05 DIAGNOSIS — L85.3 XEROSIS CUTIS: ICD-10-CM

## 2019-11-05 DIAGNOSIS — D18.0 HEMANGIOMA: ICD-10-CM

## 2019-11-05 DIAGNOSIS — Z85.828 PERSONAL HISTORY OF OTHER MALIGNANT NEOPLASM OF SKIN: ICD-10-CM | Status: RESOLVED

## 2019-11-05 DIAGNOSIS — D22 MELANOCYTIC NEVI: ICD-10-CM

## 2019-11-05 DIAGNOSIS — L81.4 OTHER MELANIN HYPERPIGMENTATION: ICD-10-CM

## 2019-11-05 PROBLEM — D22.5 MELANOCYTIC NEVI OF TRUNK: Status: ACTIVE | Noted: 2019-11-05

## 2019-11-05 PROBLEM — D18.01 HEMANGIOMA OF SKIN AND SUBCUTANEOUS TISSUE: Status: ACTIVE | Noted: 2019-11-05

## 2019-11-05 PROCEDURE — 17000 DESTRUCT PREMALG LESION: CPT

## 2019-11-05 PROCEDURE — 99213 OFFICE O/P EST LOW 20 MIN: CPT | Mod: 25

## 2019-11-05 PROCEDURE — ? COUNSELING

## 2019-11-05 PROCEDURE — ? FULL BODY SKIN EXAM

## 2019-11-05 PROCEDURE — ? LIQUID NITROGEN

## 2019-11-05 PROCEDURE — 17003 DESTRUCT PREMALG LES 2-14: CPT

## 2019-11-05 ASSESSMENT — LOCATION SIMPLE DESCRIPTION DERM
LOCATION SIMPLE: RIGHT PRETIBIAL REGION
LOCATION SIMPLE: RIGHT CALF
LOCATION SIMPLE: CHEST
LOCATION SIMPLE: UPPER BACK
LOCATION SIMPLE: LEFT CALF
LOCATION SIMPLE: LEFT PRETIBIAL REGION
LOCATION SIMPLE: LEFT HAND
LOCATION SIMPLE: RIGHT THIGH
LOCATION SIMPLE: RIGHT FOREARM
LOCATION SIMPLE: LEFT THIGH

## 2019-11-05 ASSESSMENT — LOCATION ZONE DERM
LOCATION ZONE: HAND
LOCATION ZONE: LEG
LOCATION ZONE: TRUNK
LOCATION ZONE: ARM

## 2019-11-05 ASSESSMENT — LOCATION DETAILED DESCRIPTION DERM
LOCATION DETAILED: INFERIOR THORACIC SPINE
LOCATION DETAILED: LEFT PROXIMAL PRETIBIAL REGION
LOCATION DETAILED: LEFT RADIAL DORSAL HAND
LOCATION DETAILED: LEFT PROXIMAL CALF
LOCATION DETAILED: RIGHT PROXIMAL LATERAL CALF
LOCATION DETAILED: RIGHT DISTAL DORSAL FOREARM
LOCATION DETAILED: RIGHT DISTAL PRETIBIAL REGION
LOCATION DETAILED: LEFT ANTERIOR PROXIMAL THIGH
LOCATION DETAILED: MIDDLE STERNUM
LOCATION DETAILED: LEFT LATERAL PROXIMAL PRETIBIAL REGION
LOCATION DETAILED: RIGHT ANTERIOR DISTAL THIGH
LOCATION DETAILED: RIGHT PROXIMAL PRETIBIAL REGION
LOCATION DETAILED: SUPERIOR THORACIC SPINE

## 2020-06-17 ENCOUNTER — APPOINTMENT (RX ONLY)
Dept: URBAN - METROPOLITAN AREA CLINIC 53 | Facility: CLINIC | Age: 85
Setting detail: DERMATOLOGY
End: 2020-06-17

## 2020-06-17 VITALS — TEMPERATURE: 97.5 F

## 2020-06-17 DIAGNOSIS — L81.4 OTHER MELANIN HYPERPIGMENTATION: ICD-10-CM

## 2020-06-17 DIAGNOSIS — L82.1 OTHER SEBORRHEIC KERATOSIS: ICD-10-CM

## 2020-06-17 DIAGNOSIS — D18.0 HEMANGIOMA: ICD-10-CM

## 2020-06-17 DIAGNOSIS — Z85.828 PERSONAL HISTORY OF OTHER MALIGNANT NEOPLASM OF SKIN: ICD-10-CM | Status: RESOLVED

## 2020-06-17 DIAGNOSIS — B07.8 OTHER VIRAL WARTS: ICD-10-CM

## 2020-06-17 DIAGNOSIS — D22 MELANOCYTIC NEVI: ICD-10-CM

## 2020-06-17 DIAGNOSIS — L57.0 ACTINIC KERATOSIS: ICD-10-CM

## 2020-06-17 PROBLEM — D18.01 HEMANGIOMA OF SKIN AND SUBCUTANEOUS TISSUE: Status: ACTIVE | Noted: 2020-06-17

## 2020-06-17 PROBLEM — D22.5 MELANOCYTIC NEVI OF TRUNK: Status: ACTIVE | Noted: 2020-06-17

## 2020-06-17 PROCEDURE — ? COUNSELING

## 2020-06-17 PROCEDURE — ? FULL BODY SKIN EXAM

## 2020-06-17 PROCEDURE — ? LIQUID NITROGEN

## 2020-06-17 PROCEDURE — 99214 OFFICE O/P EST MOD 30 MIN: CPT | Mod: 25

## 2020-06-17 PROCEDURE — 17000 DESTRUCT PREMALG LESION: CPT | Mod: 59

## 2020-06-17 PROCEDURE — 17110 DESTRUCTION B9 LES UP TO 14: CPT

## 2020-06-17 PROCEDURE — ? ADDITIONAL NOTES

## 2020-06-17 ASSESSMENT — LOCATION DETAILED DESCRIPTION DERM
LOCATION DETAILED: RIGHT INFERIOR MEDIAL MIDBACK
LOCATION DETAILED: LEFT LATERAL SUPERIOR CHEST
LOCATION DETAILED: RIGHT PROXIMAL CALF
LOCATION DETAILED: LEFT SUPERIOR MEDIAL UPPER BACK
LOCATION DETAILED: LEFT LATERAL PROXIMAL PRETIBIAL REGION
LOCATION DETAILED: RIGHT DISTAL DORSAL FOREARM
LOCATION DETAILED: RIGHT SUPERIOR LATERAL LOWER BACK
LOCATION DETAILED: INFERIOR THORACIC SPINE

## 2020-06-17 ASSESSMENT — LOCATION SIMPLE DESCRIPTION DERM
LOCATION SIMPLE: UPPER BACK
LOCATION SIMPLE: RIGHT CALF
LOCATION SIMPLE: RIGHT LOWER BACK
LOCATION SIMPLE: LEFT PRETIBIAL REGION
LOCATION SIMPLE: CHEST
LOCATION SIMPLE: LEFT UPPER BACK
LOCATION SIMPLE: RIGHT FOREARM

## 2020-06-17 ASSESSMENT — LOCATION ZONE DERM
LOCATION ZONE: LEG
LOCATION ZONE: TRUNK
LOCATION ZONE: ARM

## 2020-06-17 NOTE — PROCEDURE: LIQUID NITROGEN
Medical Necessity Information: It is in your best interest to select a reason for this procedure from the list below. All of these items fulfill various CMS LCD requirements except the new and changing color options.
Detail Level: Detailed
Post-Care Instructions: I reviewed with the patient in detail post-care instructions. Patient is to wear sunprotection, and avoid picking at any of the treated lesions. Pt may apply Vaseline to crusted or scabbing areas.
Render Post-Care Instructions In Note?: no
Medical Necessity Clause: This procedure was medically necessary because the lesions that were treated were:
Consent: The patient's consent was obtained including but not limited to risks of crusting, scabbing, blistering, scarring, darker or lighter pigmentary change, recurrence, incomplete removal and infection.
Detail Level: Zone
Number Of Freeze-Thaw Cycles: 1 freeze-thaw cycle
Render Post-Care Instructions In Note?: yes
Duration Of Freeze Thaw-Cycle (Seconds): 3

## 2021-09-01 NOTE — HPI: EVALUATION OF SKIN LESION(S)
What Type Of Note Output Would You Prefer (Optional)?: Bullet Format
Hpi Title: Evaluation of Skin Lesions
How Severe Are Your Spot(S)?: mild
Have Your Spot(S) Been Treated In The Past?: has not been treated
A-T Advancement Flap Text: The defect edges were debeveled with a #15 scalpel blade.  Given the location of the defect, shape of the defect and the proximity to free margins an A-T advancement flap was deemed most appropriate.  Using a sterile surgical marker, an appropriate advancement flap was drawn incorporating the defect and placing the expected incisions within the relaxed skin tension lines where possible.    The area thus outlined was incised deep to adipose tissue with a #15 scalpel blade.  The skin margins were undermined to an appropriate distance in all directions utilizing iris scissors.